# Patient Record
Sex: FEMALE | Race: BLACK OR AFRICAN AMERICAN | NOT HISPANIC OR LATINO | Employment: STUDENT | URBAN - METROPOLITAN AREA
[De-identification: names, ages, dates, MRNs, and addresses within clinical notes are randomized per-mention and may not be internally consistent; named-entity substitution may affect disease eponyms.]

---
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
## 2024-04-03 ENCOUNTER — APPOINTMENT (OUTPATIENT)
Dept: GENERAL RADIOLOGY | Facility: HOSPITAL | Age: 24
End: 2024-04-03
Payer: MEDICAID

## 2024-04-03 ENCOUNTER — HOSPITAL ENCOUNTER (EMERGENCY)
Facility: HOSPITAL | Age: 24
Discharge: HOME | End: 2024-04-04
Attending: EMERGENCY MEDICINE
Payer: MEDICAID

## 2024-04-03 DIAGNOSIS — S50.02XA CONTUSION OF LEFT ELBOW, INITIAL ENCOUNTER: ICD-10-CM

## 2024-04-03 DIAGNOSIS — S50.01XA CONTUSION OF RIGHT ELBOW, INITIAL ENCOUNTER: ICD-10-CM

## 2024-04-03 DIAGNOSIS — S63.501A WRIST SPRAIN, RIGHT, INITIAL ENCOUNTER: ICD-10-CM

## 2024-04-03 DIAGNOSIS — S83.91XA SPRAIN OF RIGHT KNEE, UNSPECIFIED LIGAMENT, INITIAL ENCOUNTER: Primary | ICD-10-CM

## 2024-04-03 PROCEDURE — 99283 EMERGENCY DEPT VISIT LOW MDM: CPT

## 2024-04-03 PROCEDURE — 73110 X-RAY EXAM OF WRIST: CPT

## 2024-04-03 PROCEDURE — 73070 X-RAY EXAM OF ELBOW: CPT

## 2024-04-03 PROCEDURE — 73562 X-RAY EXAM OF KNEE 3: CPT

## 2024-04-03 NOTE — Clinical Note
[facility]  [address]  [place] [address]    Morelia Jaffe was seen and treated in our emergency department on 4/3/2024.  She may return to work on 04/05/2024.         Thank you for choosing [facility].    Hector Abernathy MD

## 2024-04-04 VITALS
RESPIRATION RATE: 18 BRPM | HEIGHT: 62 IN | SYSTOLIC BLOOD PRESSURE: 126 MMHG | WEIGHT: 127 LBS | OXYGEN SATURATION: 96 % | DIASTOLIC BLOOD PRESSURE: 82 MMHG | HEART RATE: 78 BPM | TEMPERATURE: 98.4 F | BODY MASS INDEX: 23.37 KG/M2

## 2024-04-04 PROCEDURE — 99284 EMERGENCY DEPT VISIT MOD MDM: CPT | Performed by: EMERGENCY MEDICINE

## 2024-04-04 NOTE — FSED PROVIDER NOTE
[facility]  eMERGENCY dEPARTMENT eNCOUnter      Pt Name: Morelia Jaffe  MRN: [id number]  Birthdate 2000  Date of evaluation: 4/4/2024  Provider: Hector Abernathy MD    CHIEF COMPLAINT       Chief Complaint   Patient presents with    Fall       Nurses Notes reviewed and I agree except as noted in the HPI.      HISTORY OF PRESENT ILLNESS       History provided by:  Patient        Morelia Jaffe is a 24 y.o. female who presents to the emergency department for right leg and bilateral elbow pain.  Pt reports that she was parked on the street.  On the side there was a rather large hole.  She had to step over the hole to get into her car. Pt tells me that she had leaned over and as she stepped back, she dropped her right leg into the hole.  Pt then fell backwards and landed on her backside.  She attempted to brace herself and hurt her wrists. After the injuries she took an ibuprofen 800 mg.  Pt did not feel that this helped at all.        REVIEW OF SYSTEMS       Review of Systems   Constitutional:  Negative for fatigue and fever.   Musculoskeletal:  Positive for arthralgias (wrists bilaterally, right knee and ankle), gait problem (2/2 pain) and myalgias. Negative for back pain, neck pain and neck stiffness.   All other systems reviewed and are negative.        PAST MEDICAL HISTORY     History reviewed. No pertinent past medical history.      SURGICAL HISTORY        has no past surgical history on file.      CURRENT MEDICATIONS          Medication List        CONTINUE taking these medications      ondansetron ODT 4 MG disintegrating tablet  Commonly known as: ZOFRAN-ODT  Place 1 tablet on the tongue Every 8 (Eight) Hours As Needed for Nausea or Vomiting.     promethazine-dextromethorphan 6.25-15 MG/5ML syrup  Commonly known as: PROMETHAZINE-DM  Take 5 mL by mouth At Night As Needed for Cough.                ALLERGIES       is allergic to latex.      FAMILY HISTORY       has no family status  "information on file.    family history is not on file.    SOCIAL HISTORY        reports that she has never smoked. She has never used smokeless tobacco. She reports that she does not drink alcohol and does not use drugs.    PHYSICAL EXAM       INITIAL VITALS:  height is 157.5 cm (62\") and weight is 57.6 kg (127 lb). Her temperature is 98.4 °F (36.9 °C). Her blood pressure is 126/82 and her pulse is 78. Her respiration is 18 and oxygen saturation is 96%.      Physical Exam  Vitals and nursing note reviewed.   Constitutional:       Appearance: Normal appearance. She is not ill-appearing.   HENT:      Head: Normocephalic and atraumatic.      Nose: Nose normal.      Mouth/Throat:      Mouth: Mucous membranes are moist.      Pharynx: Oropharynx is clear.   Eyes:      Extraocular Movements: Extraocular movements intact.      Pupils: Pupils are equal, round, and reactive to light.   Musculoskeletal:         General: No swelling or tenderness. Normal range of motion.      Right elbow: No swelling. Normal range of motion.      Left elbow: No swelling. Normal range of motion.      Right wrist: No swelling, tenderness or snuff box tenderness. Normal range of motion.        Arms:       Cervical back: Normal range of motion and neck supple. No tenderness.      Right knee: No swelling, deformity, effusion or bony tenderness. Normal range of motion. Tenderness present over the patellar tendon. No LCL laxity, MCL laxity, ACL laxity or PCL laxity.        Legs:    Skin:     General: Skin is warm and dry.      Capillary Refill: Capillary refill takes less than 2 seconds.   Neurological:      General: No focal deficit present.      Mental Status: She is alert.      Deep Tendon Reflexes: Reflexes normal.   Psychiatric:         Mood and Affect: Mood normal.         Behavior: Behavior normal.         Thought Content: Thought content normal.           DIAGNOSTIC RESULTS     EKG: All EKG's are interpreted by the Emergency Department " "Physician who either signs or Co-signs this chart in the absence of a cardiologist.        RADIOLOGY: non-plain film images(s) such as CT, Ultrasound and MRI are read by the radiologist.  Plain radiographic images are visualized and preliminarily interpreted by the emergency physician unless otherwise stated below.      XR Elbow 2 View Bilateral   Final Result   No acute fracture or subluxation identified.       This report was finalized on 4/3/2024 11:25 PM by Dr. Juliet Franco M.D on Workstation: [id number]          XR Wrist 3+ View Right   Final Result   No acute fracture or subluxation identified.       This report was finalized on 4/3/2024 11:29 PM by Dr. Juliet Franco M.D on Workstation: [id number]          XR Knee 3 View Right   Final Result   No acute fracture or subluxation identified.       This report was finalized on 4/3/2024 11:26 PM by Dr. Juliet Franco M.D on Workstation: [id number]                 LABS:   Lab Results (last 24 hours)       ** No results found for the last 24 hours. **            EMERGENCY DEPARTMENT COURSE:   Vitals:    Vitals:    04/03/24 2225 04/04/24 0225   BP: 122/92 126/82   BP Location:  Right arm   Patient Position:  Lying   Pulse: 75 78   Resp: 16 18   Temp: 98.4 °F (36.9 °C)    SpO2: 100% 96%   Weight: 57.6 kg (127 lb)    Height: 157.5 cm (62\")        Medical Decision Making  Patient presents to the ED for musculoskeletal injuries following a fall after she stepped in a hole.  Imaging was unremarkable.  Exam was unremarkable.  If anything, I suspect she has a mild sprain or strain to her knee.  Patient does not complain of any significant pain on evaluation.  She told me that ibuprofen did not work well for her.  She had only taken one 800 mg ibuprofen.  She asked me how she could file a claim.  I had to explain to her that she could asked the registration upfront about getting a claim number.  After careful review of history, physical and supporting " data, there is low suspicion for a life or limb threatening cause of patient's symptoms.  The patient's symptoms have improved from presentation and they appear clinically well.  Patient reexamined prior to discharge and they appear improved.  Patient has been encouraged to follow-up with his/her PCP and to return to the ED with any lack of improvement or worsening symptoms.  The patient's questions regarding the work-up and plan were invited and answered.  The patient states understanding and agreement with discharge planning.      Problems Addressed:  Contusion of left elbow, initial encounter: complicated acute illness or injury  Contusion of right elbow, initial encounter: complicated acute illness or injury  Sprain of right knee, unspecified ligament, initial encounter: complicated acute illness or injury  Wrist sprain, right, initial encounter: complicated acute illness or injury    Amount and/or Complexity of Data Reviewed  Radiology: ordered.         The patient was given the following medications:  Medications - No data to display         CRITICAL CARE:  none    CONSULTS:  none    PROCEDURES:  Procedures  None    DIFFERENTIAL DIAGNOSIS:     Knee sprain, knee strain, radius/ulnar fracture, supracondylar fracture, elbow contusion      FINAL IMPRESSION      1. Sprain of right knee, unspecified ligament, initial encounter    2. Contusion of right elbow, initial encounter    3. Contusion of left elbow, initial encounter    4. Wrist sprain, right, initial encounter          DISPOSITION/PLAN     PATIENT REFERRED TO:  Sturgeon, Gerald Francis, MD  [address] [place] [address]  [phone]    Call in 3 days        DISCHARGE MEDICATIONS:     Medication List        CONTINUE taking these medications      ondansetron ODT 4 MG disintegrating tablet  Commonly known as: ZOFRAN-ODT  Place 1 tablet on the tongue Every 8 (Eight) Hours As Needed for Nausea or Vomiting.     promethazine-dextromethorphan  6.25-15 MG/5ML syrup  Commonly known as: PROMETHAZINE-DM  Take 5 mL by mouth At Night As Needed for Cough.              (Please note that portions of this note were completed with a voice recognition program.  Efforts were made to edit the dictations but occasionally words are mis-transcribed.)    Hector Abernathy MD (electronically signed) Emergency Physician